# Patient Record
Sex: FEMALE | Race: BLACK OR AFRICAN AMERICAN
[De-identification: names, ages, dates, MRNs, and addresses within clinical notes are randomized per-mention and may not be internally consistent; named-entity substitution may affect disease eponyms.]

---

## 2017-11-26 ENCOUNTER — HOSPITAL ENCOUNTER (EMERGENCY)
Dept: HOSPITAL 62 - ER | Age: 37
Discharge: HOME | End: 2017-11-26
Payer: MEDICAID

## 2017-11-26 VITALS — DIASTOLIC BLOOD PRESSURE: 72 MMHG | SYSTOLIC BLOOD PRESSURE: 114 MMHG

## 2017-11-26 DIAGNOSIS — Z88.6: ICD-10-CM

## 2017-11-26 DIAGNOSIS — Z88.0: ICD-10-CM

## 2017-11-26 DIAGNOSIS — M54.41: Primary | ICD-10-CM

## 2017-11-26 PROCEDURE — 96372 THER/PROPH/DIAG INJ SC/IM: CPT

## 2017-11-26 PROCEDURE — 99283 EMERGENCY DEPT VISIT LOW MDM: CPT

## 2017-11-26 NOTE — ER DOCUMENT REPORT
ED Neck/Back Problem





- General


Chief Complaint: Back Injury


Stated Complaint: RIB PAIN


Time Seen by Provider: 11/26/17 12:17


Mode of Arrival: Ambulatory


Information source: Patient


Notes: 





37-year-old female presents to ED for complaint of back pain mostly on the 

right from mid down to buttocks down the right leg 2 weeks.  She denies any 

fall injury or lifting anything.  She denies any loss of control of bowel 

bladder, loss of sensation to the saddle region, loss of control of legs or 

loss of sensation to the legs.  She is being treated by the [facility] for neck pain.  I have instructed her to follow-up with them concerning 

this pain also.


TRAVEL OUTSIDE OF THE [place] IN LAST 30 DAYS: No





- HPI


Patient complains to provider of: Pain, Lower back


Onset: Other - 2 weeks


Onset: Gradual


Timing: Waxing and waning


Quality of pain: Sharp


Severity: Moderate


Pain Level: 3


Recent injury: No


Associated symptoms: Radiation to leg, Lower back pain.  denies: Constipation, 

Fever, Like prior neck/back pain - Usually pain is in her upper back and neck 

this is in her mid to lower back down her right leg, Motor loss, Numbness/

tingling, Radiation to arm, Radiation to chest, Sensory loss, Unable to urinate

, Upper back pain


Exacerbated by: Nothing


Relieved by: Nothing


Similar symptoms previously: Yes


Recently seen / treated by doctor: Yes - In her neck but not lower back





- Related Data


Allergies/Adverse Reactions: 


 





amoxicillin [Amoxicillin] Allergy (Verified 11/26/17 11:31)


 


gabapentin [From Neurontin] Allergy (Verified 11/26/17 11:32)


 


sosa Allergy (Verified 11/26/17 11:32)


 


morphine [Morphine] Allergy (Verified 11/26/17 11:31)


 











Past Medical History





- General


Information source: Patient





- Social History


Smoking Status: Never Smoker


Cigarette use (# per day): No


Chew tobacco use (# tins/day): No


Smoking Education Provided: No


Frequency of alcohol use: Occasional


Drug Abuse: None


Lives with: Family


Family History: Reviewed & Not Pertinent


Patient has suicidal ideation: No


Patient has homicidal ideation: No





- Past Medical History


Cardiac Medical History: Reports: None


Pulmonary Medical History: Reports: Hx Asthma


EENT Medical History: Reports: None


Neurological Medical History: Reports: None


Endocrine Medical History: Reports: None


Renal/ Medical History: Reports: None


Malignancy Medical History: Reports: None


GI Medical History: Reports: None


Musculoskeltal Medical History: Reports Hx Musculoskeletal Deformity


Skin Medical History: Reports None


Psychiatric Medical History: Reports: Hx Bipolar Disorder, Hx Schizophrenia


Traumatic Medical History: Reports: None


Infectious Medical History: Reports: None


Past Surgical History: Reports: Hx Tubal Ligation





- Immunizations


Immunizations up to date: Yes


Hx Diphtheria, Pertussis, Tetanus Vaccination: Yes





Review of Systems





- Review of Systems


Constitutional: No symptoms reported


EENT: No symptoms reported


Cardiovascular: No symptoms reported


Respiratory: No symptoms reported


Gastrointestinal: No symptoms reported


Genitourinary: No symptoms reported


Female Genitourinary: No symptoms reported


Musculoskeletal: Back pain, Muscle pain, Muscle stiffness


Skin: No symptoms reported


Hematologic/Lymphatic: No symptoms reported


Neurological/Psychological: Other - Sciatic type nerve pain in the right leg


-: Yes All other systems reviewed and negative





Physical Exam





- Vital signs


Vitals: 





 











Temp Pulse Resp BP Pulse Ox


 


 98.5 F   86   16   110/63   97 


 


 11/26/17 11:32  11/26/17 11:32  11/26/17 11:32  11/26/17 11:32  11/26/17 11:32











Interpretation: Normal





- General


General appearance: Appears well, Alert





- HEENT


Head: Normocephalic, Atraumatic


Eyes: Normal


Pupils: PERRL





- Respiratory


Respiratory status: No respiratory distress


Chest status: Nontender


Breath sounds: Normal


Chest palpation: Normal





- Cardiovascular


Rhythm: Regular


Heart sounds: Normal auscultation


Murmur: No





- Abdominal


Inspection: Normal


Distension: No distension


Bowel sounds: Normal


Tenderness: Nontender


Organomegaly: No organomegaly





- Back


Back: Normal, Tender - Right lower to mid back down the right leg.  No: 

Deformity/step-off, CVA tenderness, Vertebra tenderness, Scars, Scoliosis, 

Wounds





- Extremities


General upper extremity: Normal inspection, Nontender, Normal color, Normal ROM

, Normal temperature


General lower extremity: Normal inspection, Nontender, Normal color, Normal ROM

, Normal temperature, Normal weight bearing.  No: Nba's sign





- Neurological


Neuro grossly intact: Yes


Cognition: Normal


Orientation: AAOx4


[place] Coma Scale Eye Opening: Spontaneous


[place] Coma Scale Verbal: Oriented


[place] Coma Scale Motor: Obeys Commands


[place] Coma Scale Total: 15


Speech: Normal


Motor strength normal: LUE, RUE, LLE, RLE


Sensory: Normal





- Psychological


Associated symptoms: Normal affect, Normal mood





- Skin


Skin Temperature: Warm


Skin Moisture: Dry


Skin Color: Normal





Course





- Re-evaluation


Re-evalutation: 





11/26/17 13:09


Patient treated with Toradol and Decadron and instructed to follow-up with her 

primary doctor and her pain management doctor at [facility] [place].  She states she has 600 mg ibuprofen at home that she gets from the pain 

center.  Instructed her to use these as well as Tylenol ice packs and warm 

packs.  Also gave her instruction for exercises for the low back.  





- Vital Signs


Vital signs: 





 











Temp Pulse Resp BP Pulse Ox


 


 98.5 F   86   16   110/63   97 


 


 11/26/17 11:32  11/26/17 11:32  11/26/17 11:32  11/26/17 11:32  11/26/17 11:32














Discharge





- Discharge


Clinical Impression: 


Low back pain


Qualifiers:


 Chronicity: acute Back pain laterality: right Sciatica presence: with sciatica 

Sciatica laterality: sciatica of right side Qualified Code(s): M54.41 - Lumbago 

with sciatica, right side





Condition: Stable


Disposition: HOME, SELF-CARE


Additional Instructions: 


LOW BACK PAIN:





     Three out of every four people will have an episode of disabling back pain 

during their lifetime.  Most commonly the pain is due to straining of the 

muscles and ligaments in the low back.


     Usual treatment includes: 


(1) Rest on a firm surface.  Avoid lying on your stomach.  


(2) Ice pack the painful area.  After a few days, gentle heat may be used 

intermittently to relax the area, or ice packs can be continued.  


(3) Medication may be needed -- muscle relaxers and antiinflammatory medicines 

are commonly used.  


(4) As the back improves, exercises are prescribed to strengthen the back and 

abdominal muscles.


     Your doctor will advise you on the proper care for your back at each stage 

in your recovery.  You may be better in a few days -- or healing may take 

several weeks.


     If new symptoms of a "herniated disc" (radiation of pain, numbness, or 

tingling down the back of the leg or weakness in the leg) occur, you should be 

re-examined.  Further testing may be necessary.





STEROID MEDICATION:





     You have been given an injection of medicine of the cortisone/steroid 

class.  This medication is used to control inflammation or allergy.  It is 

often continued as a pill for a short period of time, until the acute process 

subsides.


     There are usually no side effects from short-term use of cortisone-like 

medications.  Some persons feel an increased sense of well-being and are not 

sleepy at bedtime.  Long-term use of cortisone medications is best avoided, 

unless required for a severe condition.  If your condition does not remit, or 

relapses after the course of corticosteroid medication, you should consult your 

physician.





Toradol Injection





     You have been given an injection of ketorolac tromethamine (Toradol).  

This is an excellent, safe drug for pain control.  It also has potent 

antiinflammatory action.  You should have significant pain relief within about 

one hour.


     Toradol is not addicting and is non-sedating.  It does not interfere with 

driving or work.


     Call or return if you develop itching, hives, shortness of breath, or rash.





Stretching Exercises for the Back





     The physician has recommended that you begin stretching exercises for your 

back.  These are often used even while the back is painful. However, you should 

notify the physician if the activities seem to increase your pain.


     PELVIC TILT:  Lie flat on your back with knees bent.  Tighten your stomach 

and buttock muscles so it flattens your lower back against the floor.  Hold 10 

seconds.  Repeat 10 times, twice daily.


     KNEE RAISE:  Lying on the back with knees bent, raise one knee to your 

chest, then the other.  Hold both knees against the chest 10 seconds, then 

lower one knee at a time.  Repeat 10 times, twice daily.


     PARTIAL TRUNK RAISE:  Lie face down, arms at your sides.  Keeping your 

waist on the floor, use your arms raise your chest up.  Support yourself on 

your elbows for 30 seconds.  Repeat twice daily, increasing the time to two 

minutes as you recover.








ICE PACKS:


     Apply ice packs frequently against the painful area.  Many different 

schedules are recommended, such as "20 minutes on, 20 minutes off" or "one hour 

ice, two hours rest."  If you need to work, you may need to go longer between 

ice treatments.  You should plan to have the area ice packed AT LEAST one 

fourth of the time.


     The ice should be applied over the wrap, tape, or splint, or over a layer 

of cloth -- not directly against the skin.  Some ice bags have a built-in cloth 

and can be put directly on the skin.





WARM PACKS:


     After approximately two days, apply gentle heat (such as a heating pad or 

hot water bottle) for about 20 to 30 minutes about every two hours -- at least 

four times daily.  Warmth and elevation will help you make a more rapid recovery

, and will ease the pain considerably.


     Do not use HOT heat, and never apply heat for longer than 30 minutes.  The 

continuous heat can invisibly damage skin and muscles -- even when no burn is 

seen on the surface.  Damaged muscles can make you MORE sore.








FOLLOW-UP CARE:


If you have been referred to a physician for follow-up care, call the physician

s office for an appointment as you were instructed or within the next two days.

  If you experience worsening or a significant change in your symptoms, notify 

the physician immediately or return to the Emergency Department at any time for 

re-evaluation.





Please call [facility] on Monday and schedule follow-up appointment 

for your lower back pain.  Usually ibuprofen that you have for your back pain 

it will help this pain also use the stretching exercises that I have given you 

above and use the ice and warm packs as I described.

## 2018-09-28 ENCOUNTER — HOSPITAL ENCOUNTER (EMERGENCY)
Dept: HOSPITAL 62 - ER | Age: 38
Discharge: HOME | End: 2018-09-28
Payer: MEDICAID

## 2018-09-28 VITALS — DIASTOLIC BLOOD PRESSURE: 57 MMHG | SYSTOLIC BLOOD PRESSURE: 103 MMHG
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
## 2018-09-28 DIAGNOSIS — Z98.51: ICD-10-CM

## 2018-09-28 DIAGNOSIS — Z88.6: ICD-10-CM
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
## 2018-09-28 DIAGNOSIS — M76.891: ICD-10-CM

## 2018-09-28 DIAGNOSIS — S76.011A: Primary | ICD-10-CM

## 2018-09-28 DIAGNOSIS — Z88.0: ICD-10-CM

## 2018-09-28 PROCEDURE — 96372 THER/PROPH/DIAG INJ SC/IM: CPT

## 2018-09-28 PROCEDURE — 99283 EMERGENCY DEPT VISIT LOW MDM: CPT

## 2018-09-28 PROCEDURE — 73502 X-RAY EXAM HIP UNI 2-3 VIEWS: CPT

## 2018-09-28 NOTE — ER DOCUMENT REPORT
ED Hip Pain/Injury





- General


Chief Complaint: Hip Pain


Stated Complaint: RIGHT HIP PAIN


Time Seen by Provider: 09/28/18 18:06


Mode of Arrival: Ambulatory


Information source: Patient, Relative


Notes: 





Patient is a 38-year-old female comes emergency room complaint right hip pain.  

The pain actually seems to stem from the right groin.  Patient states that is 

very difficult to walk on her stand or even sit.  She states that 3 days ago 

she was standing went to turn and her right foot was anchored and she twisted 

and felt a pop in her right groin.  She had increased pain ever since then.  

She denies any other known injuries and denies any falls.  Patient has only a 

history of psych which is bipolar disorder with depression.  Last menstrual 

period was on 11 through 19 September.


TRAVEL OUTSIDE OF THE [place] IN LAST 30 DAYS: No





- HPI


Patient complains to provider of: Injury, Pain, Hip


Occurred: Other - 3 days ago


Where: Home


Onset/Duration: Sudden, Persistent


Quality of pain: Sharp, Throbbing


Severity: Moderate


Pain Level: 3


Context: Other - Twisting


Symptoms prior to fall: None


Skin Color: Normal


Skin Temperature: Warm


Rotation of extremity: Inward


Pain with palpation of the pelvis: Yes


Associated Symptoms: None





- Related Data


Allergies/Adverse Reactions: 


 





amoxicillin [Amoxicillin] Allergy (Verified 09/28/18 17:03)


 


gabapentin [From Neurontin] Allergy (Verified 09/28/18 17:03)


 


sosa Allergy (Verified 09/28/18 17:03)


 


morphine [Morphine] Allergy (Verified 09/28/18 17:03)


 











Past Medical History





- General


Information source: Patient, Relative





- Social History


Smoking Status: Former Smoker


Cigarette use (# per day): No


Chew tobacco use (# tins/day): No


Smoking Education Provided: No


Frequency of alcohol use: about 4 times a month


Drug Abuse: None


Lives with: Family


Family History: Reviewed & Not Pertinent


Patient has suicidal ideation: No


Patient has homicidal ideation: No


Pulmonary Medical History: Reports: Hx Asthma


Renal/ Medical History: Denies: Hx Peritoneal Dialysis


Musculoskeletal Medical History: Reports Hx Musculoskeletal Deformity


Psychiatric Medical History: Reports: Hx Bipolar Disorder, Hx Schizophrenia


Past Surgical History: Reports: Hx Tubal Ligation





- Immunizations


Immunizations up to date: Yes


Hx Diphtheria, Pertussis, Tetanus Vaccination: Yes





Review of Systems





- Review of Systems


Constitutional: No symptoms reported


EENT: No symptoms reported


Cardiovascular: No symptoms reported


Respiratory: No symptoms reported


Gastrointestinal: No symptoms reported


Genitourinary: No symptoms reported


Female Genitourinary: No symptoms reported


Musculoskeletal: Joint pain, Joint swelling, Muscle pain, Muscle stiffness


Skin: No symptoms reported


Hematologic/Lymphatic: No symptoms reported


Neurological/Psychological: No symptoms reported


-: Yes All other systems reviewed and negative





Physical Exam





- Vital signs


Vitals: 


 











Temp Pulse BP Pulse Ox


 


 97.8 F   73   119/64   98 


 


 09/28/18 17:13  09/28/18 17:13  09/28/18 17:13  09/28/18 17:13











Interpretation: Normal





- Notes


Notes: 





Well-nourished well-developed 38-year-old obese female.





- HEENT


Head: Normocephalic, Atraumatic


Eyes: Normal





- Respiratory


Respiratory status: No respiratory distress


Chest status: Nontender.  No: No pleuritic chest pain, Pain with deep breathing


Breath sounds: Normal.  No: Decreased air movement, Productive cough, Rales, 

Rhonchi, Stridor, Wheezing


Chest palpation: Normal





- Cardiovascular


Rhythm: Regular


Heart sounds: Normal auscultation


Murmur: No





- Abdominal


Inspection: Normal


Distension: No distension


Bowel sounds: Normal


Tenderness: Nontender


Organomegaly: No organomegaly.  No: Hepatomegaly, Splenomegaly


Adult front & back diagram: 


  __________________________














  __________________________





 1 - Area of tenderness to palpation.








- Back


Back: Normal, Nontender





- Extremities


General upper extremity: Normal inspection, Nontender, Normal ROM, Normal 

strength


General lower extremity: Tender, Normal color, Normal temperature.  No: Normal 

inspection, Edema, Normal ROM, Normal strength, Normal weight bearing, Nba's 

sign


Hip: Tender, Pain with ROM, Other - Examination patient's right hip shows her 

to be sitting on the cot.  Legs extended.  Left leg looks slightly lower than 

the right leg.  But is not rotated internally or externally.  Patient has good 

popliteal pulse on the right knee.  Palpation of the anterior groin shows 

moderate amount of tenderness reproduced to the palpation.  The pain 

distribution seems to run along the groin and into the iliac crest area.  There 

is no sciatic pain and there is no posterior back pain no buttocks pain.  All 

seems to be standardized right in the right hip area.  There is a good pulse in 

the femoral pulse.  No other abnormalities found.  Although there is loss of 

strength against resistance to lifting the leg.  It appears either to be a 

strain groin or hip flexor muscle..  No: Abrasion, Deformity, Dislocation, 

Ecchymosis, Instability, Laceration, Unable to bear weight





Course





- Re-evaluation


Re-evalutation: 





09/28/18 21:11


I have placed patient on a muscle relaxer some steroids and pain medication.  I 

have instructed her to wear either a girdle or biking shorts.  And have told 

her to follow-up with orthopedic on Monday if not getting better.  Also told 

her to come back to ER over the weekend if it were to progressively get worse.





- Vital Signs


Vital signs: 


 











Temp Pulse Resp BP Pulse Ox


 


 98.6 F   65   18   103/57 L  99 


 


 09/28/18 20:35  09/28/18 20:35  09/28/18 20:35  09/28/18 20:35  09/28/18 20:35














Discharge





- Discharge


Clinical Impression: 


Strain of right hip


Qualifiers:


 Encounter type: initial encounter Qualified Code(s): S76.011A - Strain of 

muscle, fascia and tendon of right hip, initial encounter





Hip flexor tendinitis


Qualifiers:


 Laterality: right Qualified Code(s): M76.891 - Other specified enthesopathies 

of right lower limb, excluding foot





Condition: Stable


Disposition: HOME, SELF-CARE


Instructions:  Inguinal Strain ([facility]), Muscle Strain ([facility]), Tendon Strain ([facility])


Additional Instructions: 


Home and rest.  Ice to the area 3 times a day as we discussed.  Light 

stretching also as we discussed.  This could be a tendinitis could be a hip 

flexor strain may take some time for her to get better.  Highly suggest that 

you contact the orthopedist that this is still bothering you on Monday or 

Tuesday.  As we also discussed you may wear a girdle which will help with this 

and/or biking shorts will give some support as well.  At this point since his 

been 3 days he may also be is a whirlpool or moist heat.  Should you have any 

concerns or problems return to ER for recheck.


Prescriptions: 


Cyclobenzaprine HCl [Flexeril 10 mg Tablet] 10 mg PO TIDP PRN #21 tablet


 PRN Reason: 


Hydrocodone/Acetaminophen [Norco 5-325 mg Tablet] 1 tab PO Q6 #8 tablet


Prednisone [Sterapred Ds] 10 mg PO ASDIR PRN #1 [id number]


 PRN Reason: 


Referrals: 


NILE YING, RIDDHIP [Primary Care Provider] - Follow up as needed

## 2019-08-01 ENCOUNTER — HOSPITAL ENCOUNTER (EMERGENCY)
Dept: HOSPITAL 62 - ER | Age: 39
Discharge: HOME | End: 2019-08-01
Payer: MEDICAID

## 2019-08-01 VITALS — DIASTOLIC BLOOD PRESSURE: 66 MMHG | SYSTOLIC BLOOD PRESSURE: 109 MMHG

## 2019-08-01 DIAGNOSIS — I10: ICD-10-CM

## 2019-08-01 DIAGNOSIS — M54.12: Primary | ICD-10-CM

## 2019-08-01 DIAGNOSIS — Z98.51: ICD-10-CM

## 2019-08-01 PROCEDURE — 96372 THER/PROPH/DIAG INJ SC/IM: CPT

## 2019-08-01 PROCEDURE — 99283 EMERGENCY DEPT VISIT LOW MDM: CPT

## 2019-08-01 NOTE — ER DOCUMENT REPORT
HPI





- HPI


Patient complains to provider of: right neck pain


Time Seen by Provider: 08/01/19 11:37


Onset: Other - 2 weeks


Onset/Duration: Constant, Persistent


Quality of pain: Burning, Cramping


Severity: Moderate


Pain Level: 3


Context: 





39-year-old female with a history of bulging discs in her neck here for acute 

exacerbation of her chronic neck pain that radiates down her right arm for 2 

weeks.  She was previously on pain management a year or so ago and now is 

awaiting to get back into pain management again for her chronic neck pain.  She 

states her symptoms have been worse for the last 2 weeks.  Denies any new injury

or trauma.  Feels like her usual flareups when they happen, nothing different. 

pain just isn't controlled with otc meds any longer.  She states that when she 

was on pain management she is on oxycodone and that worked well.  She also takes

Valium 5 mg 3 times daily and has been on for many years.  She states the valium

alone isn't helping her pain much but does some.  She does have a TENS unit.  

She states the earliest that pain management could get her back in was August 26

and she is just here requesting some pain control until she get into pain 

management again.  No numbness, tingling, or weakness.  No spinal surgeries.  No

fever, headache or rash.  No recent illness and no other changes in medication 

or diet.  No vision changes or other changes in neurologic or ams. Pain worse 

with movement and palpation.  Better with rest.  No other complaints at this 

time. denies pregnancy. no pain anywhere else. hasn't sought care until now. 

states when she has come in here before they gave her a toradol shot and that 

helped and she would like another one today. denies iv drug use. she is right 

handed. states steroids don't help. 


Exacerbated by: Movement


Relieved by: Remaining still


Similar symptoms previously: Yes


Recently seen / treated by doctor: No





- ROS


Systems Reviewed and Negative: Yes All other systems reviewed and negative - to 

include 10 systems, unless mentioned in the hpi





- REPRODUCTIVE


Reproductive: DENIES: Pregnant:





Past Medical History





- General


Information source: Patient, [facility] Records





- Social History


Smoking Status: Unknown if Ever Smoked


Frequency of alcohol use: None


Drug Abuse: None


Family History: Reviewed & Not Pertinent


Patient has suicidal ideation: No


Patient has homicidal ideation: No





- Past Medical History


Cardiac Medical History: Reports: Hx Hypertension


Pulmonary Medical History: Reports: Hx Asthma


Neurological Medical History: Reports: None


Endocrine Medical History: Denies: Hx Diabetes Mellitus Type 1, Hx Diabetes 

Mellitus Type 2


Renal/ Medical History: Denies: Hx Peritoneal Dialysis


Musculoskeletal Medical History: Reports Hx Arthritis, Reports Hx Muscle Spasm, 

Reports Other - Degenerative disc diseace, chronic right cervical radiculopathy


Psychiatric Medical History: Reports: Hx Bipolar Disorder, Hx Schizophrenia


Past Surgical History: Reports: Hx Tubal Ligation





- Immunizations


Immunizations up to date: Yes


Hx Diphtheria, Pertussis, Tetanus Vaccination: Yes





Vertical Provider Document





- CONSTITUTIONAL


Exam Limitations: No Limitations


Notes: 





>>>> PHYSICAL_EXAM:


  GENERAL_APPEARANCE: well_nourished, alert, cooperative, no_acute_distress, 

mild_obvious_discomfort. pleasant, obese young black female, smiling, speaking 

in full sentences, in no sign of resp distress, appears uncomfortable with 

movement of her neck but othewise not toxic. 


  VITALS: reviewed, see vital signs table.


  HEAD: no_swelling\tenderness on the head. normocephalic. atraumatic. no walsh

signs. no raccoons eyes. 


  EARS: canals_clear_bilat, TMs_clear. no hemotympanum


  EYES: PERRL, EOMI, conjunctiva_clear.


  NOSE: no_nasal_discharge.


  MOUTH: (-)decreased moisture.


  THROAT: no_tonsilar_inflammation, no_airway_obstruction. no_lymphadenopathy


  NECK: supple, no midline neck_tenderness, no step-offs or deformities.  There 

is tenderness to palpation of the right paracervical musculature.  Spasm noted. 

Palpation here represents patient's exactly.  Slight decreased range of motion 

in her neck on lateral bending and rotation secondary only to pain.  No 

torticollis.  No sign of Alexi's or  mastoiditis. (-)thyromegaly. full 

strength. no jvd. no carotid bruit. no meningeal signs. no sign of central cord 

syndrome. 


  BACK: no_back_tenderness.


  CHEST_WALL: no_chest_tenderness. no overlying skin changes 


  LUNGS: no_wheezing, ctab  (-)accessory muscle use, good air exchange 

bilateral.


  HEART: normal_rate, normal_rhythm, 


  ABDOMEN: normal_BS, soft, no_abd_tenderness, (-)guarding, (-)rebound, no 

distension or peritoneal signs. no cva ttp


  EXTREMITIES: strength 5/5 in all_extremities, good pulses in all_extremities, 

no_swelling\tenderness in the extremities, no_edema. full rom. normal gait. good

pulses. brisk cap refill. good hand . neg drop can test. neg speed and 

yergason tests. no atrophy. 


NEURO: motor and sensation intact, cranial nerves 2-12 intact, cerebellar fxn 

intact, reflexes wnl 


  SKIN: warm, dry, good_color, no_rash. no grossly visible overlying skin 

changes to suggest trauma. 


  MENTAL_STATUS: speech_clear, oriented_X_3, normal_affect, 

responds_appropriately to questions.








- INFECTION CONTROL


TRAVEL OUTSIDE OF THE [place] IN LAST 30 DAYS: No





Course





- Re-evaluation


Re-evalutation: 





08/01/19 12:36


Pt here for an acute exacerbation of her chronic right-sided neck pain with 

radiculopathy, nothing new, same exact sx when it flares up, no fall or trauma. 

pain just not controlled with otc meds. she is now waiting to get back into pain

management-has an apt aug 26 and states that's the soonest they could get her 

in.  She improved with Toradol here.  She is neuro nonfocal.  Pain reproducible 

in the musculature and likely musculoskeletal.  She has a TENS unit at home she 

can continue to take.  Ice/heat to the area. will dc with just a few vicodin. ga

ve medication precautions for this. advised of our narcotic policy and advised 

this would likely be the last time she got narcotic pain meds for this complaint

here and stressed the importance of f/u with pain management closely without 

fail. Secondary to her already being on valium, i told her i do not feel 

comfortable prescribing her any another muscle relaxers in addition to this due 

to concern for respiratory depression. she refused steroids. advised to f/u with

pcp/pain management in 1-2 days. return for any worsening symptoms. vss. well 

appearing. satting well on ra. neurononfocal. pt understands and agrees to plan.

 according to the [place] drug monitoring database, she gets monthly 

Valium 5 mg #90 with the last being 6/27/2019, the last time she got Vicodin was

9/29/2018 for 8 Vicodin, she got Percocet 7.5 mg #60 back in April 2018 however 

no other pain medicine since.





On reexam, pt improved with tx listed. remained stable. nontoxic. well 

appearing. pain controlled. tolerating po. requesting to go home. neurononfocal.

no sign of spinal cord involvement, cauda equina, or central cord syndrome. 





 Documentation achieved through voice recording which my lead to some occasional

accidental typographical errors. Extensive efforts have been made to proof read 

documentation to make sure these are the least as possible.





                                        











 Category Date Time Status


 


 Ketorolac Tromethamine [Toradol Inj/Pf 60 mg/2 ml Sdv] Med  08/01/19 11:53 

Discontinued





 60 mg IM NOW ONE   




















- Vital Signs


Vital signs: 


                                        











Temp Pulse Resp BP Pulse Ox


 


 97.7 F   66   20   109/66   98 


 


 08/01/19 11:57  08/01/19 11:57  08/01/19 11:57  08/01/19 11:57  08/01/19 11:57











08/01/19 12:42


                                        











  Temp Pulse Pulse Resp BP BP Pulse Ox


 


 08/01/19 11:57  97.7 F  66   20  109/66   98


 


 08/01/19 11:18  97.8 F   70  20   106/62  96




















Discharge





- Discharge


Clinical Impression: 


 Right cervical radiculopathy





Condition: Good


Disposition: HOME, SELF-CARE


Instructions:  Radiculopathy ([facility])


Additional Instructions: 


Ice/heat to your neck.  Continue to use your TENS unit.  Do not drive, take 

Tylenol, operate machinery, or work while taking the Vicodin.  you can continue 

to use your Valium as prescribed as needed for muscle relaxation.  Call your 

pain management doctor for a sooner appointment.  Follow-up with PCP/pain 

management in 1 to 2 days.  Return for any worsening symptoms.


Prescriptions: 


Hydrocodone/Acetaminophen [Norco 5-325 mg Tablet] 1 tab PO Q6 PRN #10 tablet


 PRN Reason: For Pain


Referrals: 


NILE YING FNP [Primary Care Provider] - Follow up as needed

## 2020-01-13 ENCOUNTER — HOSPITAL ENCOUNTER (EMERGENCY)
Dept: HOSPITAL 62 - ER | Age: 40
Discharge: HOME | End: 2020-01-13
Payer: MEDICAID

## 2020-01-13 VITALS — DIASTOLIC BLOOD PRESSURE: 68 MMHG | SYSTOLIC BLOOD PRESSURE: 114 MMHG

## 2020-01-13 DIAGNOSIS — S16.1XXA: Primary | ICD-10-CM

## 2020-01-13 DIAGNOSIS — M54.9: ICD-10-CM
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
## 2020-01-13 DIAGNOSIS — M54.2: ICD-10-CM

## 2020-01-13 DIAGNOSIS — I10: ICD-10-CM

## 2020-01-13 DIAGNOSIS — M79.604: ICD-10-CM

## 2020-01-13 DIAGNOSIS — Z87.891: ICD-10-CM

## 2020-01-13 DIAGNOSIS — J45.909: ICD-10-CM

## 2020-01-13 DIAGNOSIS — M54.5: ICD-10-CM

## 2020-01-13 PROCEDURE — 99283 EMERGENCY DEPT VISIT LOW MDM: CPT

## 2020-01-13 PROCEDURE — 96372 THER/PROPH/DIAG INJ SC/IM: CPT

## 2020-01-13 NOTE — ER DOCUMENT REPORT
HPI





- HPI


Time Seen by Provider: 01/13/20 13:32


Pain Level: 5


Notes: 








39-year-old female patient presenting with right upper neck pain, right back 

pain, right leg pain and right low back pain that started approximately 3 days 

ago.  Patient reports she has a history of a herniated cervical disc that flares

up frequently when it rains.  She also reports osteoarthritis in her back.  She 

currently sees pain management.  She states that occasionally she gets flares 

like this and she comes in for prednisone prescriptions.  Denies any loss of 

control of bowel or bladder, denies any saddle anesthesia.  Ambulates without 

difficulty.








- CONSTITUTIONAL


Constitutional: DENIES: Fever





- REPRODUCTIVE


Reproductive: DENIES: Pregnant:





- MUSCULOSKELETAL


Musculoskeletal: REPORTS: Extremity pain





Past Medical History





- General


Information source: Patient





- Social History


Smoking Status: Former Smoker


Frequency of alcohol use: Occasional


Family History: Reviewed & Not Pertinent


Patient has suicidal ideation: No


Patient has homicidal ideation: No





- Past Medical History


Cardiac Medical History: Reports: Hx Hypertension


Pulmonary Medical History: Reports: Hx Asthma


Endocrine Medical History: Denies: Hx Diabetes Mellitus Type 1, Hx Diabetes 

Mellitus Type 2


Renal/ Medical History: Denies: Hx Peritoneal Dialysis


Musculoskeletal Medical History: Reports Hx Arthritis, Reports Hx Muscle Spasm, 

Reports Hx Musculoskeletal Deformity


Psychiatric Medical History: Reports: Hx Bipolar Disorder, Hx Schizophrenia


Past Surgical History: Reports: Hx Tubal Ligation





- Immunizations


Immunizations up to date: Yes


Hx Diphtheria, Pertussis, Tetanus Vaccination: Yes





Vertical Provider Document





- CONSTITUTIONAL


Notes: 





PHYSICAL EXAMINATION:





GENERAL: Well-appearing, well-nourished and in no acute distress.





HEAD: Atraumatic, normocephalic.





EYES: Pupils equal round extraocular movements intact,  conjunctiva are normal.





ENT: Nares patent





NECK: Normal range of motion





LUNGS: No respiratory distress





Musculoskeletal: Normal range of motion, tenderness to the musculoskeletal area 

from the right neck down into the right scapula, right thoracic area and right 

lumbar spine.  No vertebral tenderness, step-off or deformity.  No focal 

neurological deficits.





NEUROLOGICAL:  Normal speech, normal gait. 





PSYCH: Normal mood, normal affect.





SKIN: Warm, Dry, normal turgor, no rashes or lesions noted.





- INFECTION CONTROL


TRAVEL OUTSIDE OF THE [place] IN LAST 30 DAYS: No





Course





- Re-evaluation


Re-evalutation: 





Patient will be treated conservatively with prednisone and muscle relaxers, will

be discharged home, encourage close follow-up with primary care and pain 

management team.








- Vital Signs


Vital signs: 


                                        











Temp Pulse Resp BP Pulse Ox


 


 98.4 F   73   18   114/68   100 


 


 01/13/20 13:14  01/13/20 13:14  01/13/20 13:14  01/13/20 13:14  01/13/20 13:14














Discharge





- Discharge


Clinical Impression: 


Neck strain


Qualifiers:


 Encounter type: initial encounter Qualified Code(s): S16.1XXA - Strain of 

muscle, fascia and tendon at neck level, initial encounter





Condition: Stable


Disposition: HOME, SELF-CARE


Additional Instructions: 


Please take medications as prescribed.  Please keep any and all appointments 

that you have with both your pain management team and your primary care team.





Prescriptions: 


Prednisone [Deltasone 20 mg Tablet] 3 tab PO DAILY 5 Days #15 tablet


Methocarbamol [Robaxin 750 mg Tablet] 750 mg PO Q4 #30 tablet


Referrals: 


NILE YING FNP [COMMUNITY BASED STAFF] - Follow up as needed

## 2020-02-01 ENCOUNTER — HOSPITAL ENCOUNTER (EMERGENCY)
Dept: HOSPITAL 62 - ER | Age: 40
Discharge: HOME | End: 2020-02-01
Payer: MEDICAID

## 2020-02-01 VITALS — SYSTOLIC BLOOD PRESSURE: 124 MMHG | DIASTOLIC BLOOD PRESSURE: 68 MMHG

## 2020-02-01 DIAGNOSIS — S93.401A: Primary | ICD-10-CM

## 2020-02-01 DIAGNOSIS — J45.909: ICD-10-CM
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
## 2020-02-01 DIAGNOSIS — I10: ICD-10-CM

## 2020-02-01 PROCEDURE — 99283 EMERGENCY DEPT VISIT LOW MDM: CPT

## 2020-02-01 PROCEDURE — 29515 APPLICATION SHORT LEG SPLINT: CPT

## 2020-02-01 PROCEDURE — 73610 X-RAY EXAM OF ANKLE: CPT

## 2020-02-01 PROCEDURE — 73630 X-RAY EXAM OF FOOT: CPT

## 2020-02-01 PROCEDURE — 2W3QX1Z IMMOBILIZATION OF RIGHT LOWER LEG USING SPLINT: ICD-10-PCS

## 2020-02-01 NOTE — RADIOLOGY REPORT (SQ)
EXAM DESCRIPTION:  ANKLE RIGHT COMPLETE



COMPLETED DATE/TIME:  2/1/2020 3:10 pm



REASON FOR STUDY:  right foot and ankle injury



COMPARISON:  None.



NUMBER OF VIEWS:  Three views.



TECHNIQUE:  AP, lateral, and oblique radiographic images acquired of the right ankle.



LIMITATIONS:  None.



FINDINGS:  MINERALIZATION: Normal.

BONES: No acute fracture or dislocation.  No worrisome bone lesions.

JOINTS: No effusions.

SOFT TISSUES: No soft tissue swelling. No foreign body.

OTHER: No other significant finding.



IMPRESSION:  NEGATIVE STUDY OF THE RIGHT ANKLE. NO RADIOGRAPHIC EVIDENCE OF ACUTE INJURY.



TECHNICAL DOCUMENTATION:  JOB ID:  [id number]

 2011 [employer]- All Rights Reserved



Reading location - IP/workstation name: [email or web address]

## 2020-02-01 NOTE — ER DOCUMENT REPORT
HPI





- HPI


Patient complains to provider of: Ankle injury


Time Seen by Provider: 02/01/20 14:41


Notes: 





39-year-old female to the emergency department with complaints of right ankle 

injury that occurred yesterday morning.  She states that she missed a step and 

twisted her foot underneath her.  She states she was able to walk on the foot 

all day yesterday but when she got home and took her tennis shoe off she had 

increasing pain and swelling.  Since then she has not been able to bear any 

weight.  She has been using her daughter's crutches.  She denies any other 

injuries.  She states she has been taking Tylenol and Excedrin for the pain 

which has not helped.





- ROS


ROS below otherwise negative: No


Systems Reviewed and Negative: Yes All other systems reviewed and negative





- CONSTITUTIONAL


Constitutional: DENIES: Fever, Chills





- EENT


EENT: DENIES: Sore Throat, Ear Pain, Nasal Drainage-Clear, Nasal Drainage-

Purulent, Congestion, Eye problems





- NEURO


Neurology: DENIES: Headache





- CARDIOVASCULAR


Cardiovascular: DENIES: Chest pain





- RESPIRATORY


Respiratory: DENIES: Trouble Breathing, Coughing





- GASTROINTESTINAL


Gastrointestinal: DENIES: Abdominal Pain, Nausea, Patient vomiting, Diarrhea, 

Constipation





- REPRODUCTIVE


Reproductive: DENIES: Pregnant:





- MUSCULOSKELETAL


Musculoskeletal: REPORTS: Extremity pain


Notes: 





See HPI





- DERM


Skin Color: Normal


Skin Problems: None





Past Medical History





- General


Information source: Patient





- Social History


Smoking Status: Never Smoker


Frequency of alcohol use: None


Drug Abuse: None


Family History: Reviewed & Not Pertinent





- Past Medical History


Cardiac Medical History: Reports: Hx Hypertension


Pulmonary Medical History: Reports: Hx Asthma


Endocrine Medical History: Denies: Hx Diabetes Mellitus Type 1, Hx Diabetes 

Mellitus Type 2


Renal/ Medical History: Denies: Hx Peritoneal Dialysis


Musculoskeletal Medical History: Reports Hx Arthritis, Reports Hx Muscle Spasm, 

Reports Hx Musculoskeletal Deformity


Psychiatric Medical History: Reports: Hx Bipolar Disorder, Hx Schizophrenia


Past Surgical History: Reports: Hx Tubal Ligation





- Immunizations


Immunizations up to date: Yes


Hx Diphtheria, Pertussis, Tetanus Vaccination: Yes





Vertical Provider Document





- CONSTITUTIONAL


Agree With Documented VS: Yes


Exam Limitations: No Limitations


General Appearance: WD/WN, No Apparent Distress





- INFECTION CONTROL


TRAVEL OUTSIDE OF THE [place] IN LAST 30 DAYS: No





- HEENT


HEENT: Atraumatic, Normal ENT Exam, Normocephalic, PERRLA





- NECK


Neck: Normal Inspection, Supple





- RESPIRATORY


Respiratory: Breath Sounds Normal, No Respiratory Distress.  negative: Rales, 

Rhonchi, Wheezing





- CARDIOVASCULAR


Cardiovascular: Regular Rate, Regular Rhythm, No Murmur





- GI/ABDOMEN


Gastrointestinal: Abdomen Soft, Abdomen Non-Tender





- BACK


Back: Normal Inspection





- MUSCULOSKELETAL/EXTREMETIES


Notes: 





There is tenderness to palpation over the anterior right ankle with noted edema.

 There is no ecchymosis.  There is also tenderness along the right metacarpal.  

Patient can wiggle all toes.  DP pulses are intact and equal.  Cap refill is 

less than 2 seconds.  She is nontender to palpation over the right





- NEURO


Level of Consciousness: Awake, Alert


Motor/Sensory: No Motor Deficit, No Sensory Deficit





- DERM


Integumentary: Warm, Dry, No Rash





Course





- Re-evaluation


Re-evalutation: 





02/01/20 


Impression: Right ankle sprain likely grade 2.  Will place an ankle stirrup and 

on crutches.  Encouraged rest, elevation, compression, ice.  Patient agrees with

the plan.  Orthopedic follow-up.





- Vital Signs


Vital signs: 


                                        











Temp Pulse Resp BP Pulse Ox


 


 98.6 F   75   18   121/64   98 


 


 02/01/20 14:09  02/01/20 14:09  02/01/20 14:09  02/01/20 14:09  02/01/20 14:09














Procedures





- Immobilization


  ** Right Ankle


Pre-Proc Neuro Vasc Exam: Normal


Immobilizer type: Ankle stirrup


Performed by: PCT


Post-Proc Neuro Vasc Exam: Normal


Alignment checked and good: Yes





Discharge





- Discharge


Clinical Impression: 


Right ankle sprain


Qualifiers:


 Encounter type: initial encounter Involved ligament of ankle: unspecified liga

ment Qualified Code(s): S93.401A - Sprain of unspecified ligament of right 

ankle, initial encounter





Condition: Stable


Disposition: HOME, SELF-CARE


Instructions:  Sprained Ankle ([facility])


Additional Instructions: 


Rest, ice, elevate and use splint.  Use crutches.  Take medicines as prescribed 

for pain.  Ice for 20 minutes at a time 3 times a day.  Follow-up with 

orthopedist.


Prescriptions: 


Naproxen [Naprosyn] 500 mg PO BID #20 tablet


Forms:  Return to Work


Referrals: 


ELDA TRACEY PA-C [Primary Care Provider] - Follow up in 3-5 days


VAISHALI YEE JR, DO [ACTIVE PROVISIONAL STAFF] - Follow up as needed

## 2020-02-01 NOTE — RADIOLOGY REPORT (SQ)
EXAM DESCRIPTION:  FOOT RIGHT COMPLETE



COMPLETED DATE/TIME:  2/1/2020 3:10 pm



REASON FOR STUDY:  right foot and ankle injury



COMPARISON:  None.



NUMBER OF VIEWS:  Three views.



TECHNIQUE:  AP, lateral and oblique  radiographic images acquired of the right foot.



LIMITATIONS:  None.



FINDINGS:  MINERALIZATION: Normal.

BONES: No acute fracture or dislocation.  No worrisome bone lesions.

JOINTS: No effusions.

SOFT TISSUES: No soft tissue swelling.  No foreign body.

OTHER: No other significant finding.



IMPRESSION:  NEGATIVE STUDY OF THE RIGHT FOOT. NO RADIOGRAPHIC EVIDENCE OF ACUTE INJURY.



TECHNICAL DOCUMENTATION:  JOB ID:  [id number]

 2011 [employer]- All Rights Reserved



Reading location - IP/workstation name: [email or web address]
